# Patient Record
Sex: FEMALE | Race: WHITE
[De-identification: names, ages, dates, MRNs, and addresses within clinical notes are randomized per-mention and may not be internally consistent; named-entity substitution may affect disease eponyms.]

---

## 2019-07-10 ENCOUNTER — HOSPITAL ENCOUNTER (EMERGENCY)
Dept: HOSPITAL 25 - UCCORT | Age: 64
Discharge: HOME | End: 2019-07-10
Payer: MEDICARE

## 2019-07-10 VITALS — SYSTOLIC BLOOD PRESSURE: 122 MMHG | DIASTOLIC BLOOD PRESSURE: 69 MMHG

## 2019-07-10 DIAGNOSIS — E11.9: ICD-10-CM

## 2019-07-10 DIAGNOSIS — F17.210: ICD-10-CM

## 2019-07-10 DIAGNOSIS — L03.116: Primary | ICD-10-CM

## 2019-07-10 DIAGNOSIS — Z79.84: ICD-10-CM

## 2019-07-10 DIAGNOSIS — L03.115: ICD-10-CM

## 2019-07-10 DIAGNOSIS — J44.9: ICD-10-CM

## 2019-07-10 PROCEDURE — 99212 OFFICE O/P EST SF 10 MIN: CPT

## 2019-07-10 PROCEDURE — G0463 HOSPITAL OUTPT CLINIC VISIT: HCPCS

## 2019-07-10 NOTE — UC
Skin Complaint HPI





- HPI Summary


HPI Summary: 





Per RN triage: "B/L lower leg sores for 7-10 days, no known injury. Pain and 

erythema for 2 days. Pt states she thinks sores are R/T DM. Pt has applied 

neosporin and polysporin without improvement"


-+ DM. she couldnt get an appt w / her PCP


-dneis fevers/chills. has 3 spots on rt leg, 1 on left


-denies any problems w/ renal function. no abx allergies. 


-central eschars forming on all but 1 spot on distal Rt LE, which is the newest 

spot.


there is some mild surrounding swelling.  


-she reports that he BSs are unchanged. 





- History of Current Complaint


Chief Complaint: UCSkin


Time Seen by Provider: 07/10/19 15:26


Stated Complaint: BILATERAL LEG SORES/CRAMPING


Hx Last Menstrual Period: "years ago."


Pain Intensity: 9





- Allergy/Home Medications


Allergies/Adverse Reactions: 


 Allergies











Allergy/AdvReac Type Severity Reaction Status Date / Time


 


heparin Allergy  Unknown Verified 07/10/19 14:50





   Reaction  





   Details  











Home Medications: 


 Home Medications





Albuterol HFA INHALER* [Ventolin HFA Inhaler*] 2 puff INH Q4H PRN 07/10/19 [

History Confirmed 07/10/19]


Amitriptyline TAB* [Elavil TAB*] 50 mg PO BEDTIME 07/10/19 [History Confirmed 07

/10/19]


Aspirin [Aspir-Low] 81 mg PO DAILY 07/10/19 [History Confirmed 07/10/19]


Budesonide/Formote 160/4.5(NF) [Symbicort 160/4.5 (NF)] 2 puff INH BID 07/10/19 

[History Confirmed 07/10/19]


Bupropion XL* [Wellbutrin XL *] 300 mg PO DAILY 07/10/19 [History Confirmed 07/

10/19]


Fenofibrate(NF) [Tricor(NF)] 145 mg PO DAILY 07/10/19 [History Confirmed 07/10/

19]


Fluoxetine HCl [Prozac] 10 mg PO DAILY 07/10/19 [History Confirmed 07/10/19]


Gabapentin 800 mg PO TID 07/10/19 [History Confirmed 07/10/19]


Insulin Detemir (NF) [Levemir (NF)] 55 unit SUBCUT DAILY 07/10/19 [History 

Confirmed 07/10/19]


Liraglutide (NF) [Victoza (NF)] 0.6 mg SUBCUT DAILY 07/10/19 [History Confirmed 

07/10/19]


Loratadine 10 mg PO DAILY 07/10/19 [History Confirmed 07/10/19]


Malathion 0.5 % EX DAILY 07/10/19 [History Confirmed 07/10/19]


Meloxicam [Mobic] 15 mg PO DAILY 07/10/19 [History Confirmed 07/10/19]


Metoprolol Tartrate TAB* [Lopressor TAB*] 25 mg PO BID 07/10/19 [History 

Confirmed 07/10/19]


Mirabegron (NF) [Myrbetriq (NF)] 50 mg PO DAILY 07/10/19 [History Confirmed 07/

10/19]


Nystatin OINT* 1 applic TOPICAL BID 07/10/19 [History Confirmed 07/10/19]


Nystatin [Nyamyc] 100,000 unit EX BID 07/10/19 [History Confirmed 07/10/19]


Omeprazole 20 mg PO DAILY 07/10/19 [History Confirmed 07/10/19]


Varenicline (NF) [Chantix 1 MG TAB (NF)] 1 mg PO BID 07/10/19 [History 

Confirmed 07/10/19]











PMH/Surg Hx/FS Hx/Imm Hx


Previously Healthy: Yes


Endocrine History: Diabetes


Respiratory History: COPD


Other History Of: Anticoagulant Therapy - Aspirin ("baby aspirin every day").


   Negative For: HIV, Hepatitis B, Hepatitis C





- Surgical History


Surgical History: Yes


Surgery Procedure, Year, and Place: 2012 2 valve replacements.  Rectocele; 

left hip replacement.  Ear surgery





- Family History


Known Family History: Positive: Cardiac Disease - Brother  from heart 

disease at 50 years old., Hypertension, Diabetes





- Social History


Alcohol Use: Rare


Substance Use Type: None


Smoking Status (MU): Light Every Day Tobacco Smoker


Amount Used/How Often: 3-4 cigarettes/day--"Carton lasts a couple weeks."





Review of Systems


All Other Systems Reviewed And Are Negative: Yes


Constitutional: Positive: Negative


Skin: Positive: Rash


Eyes: Positive: Negative


ENT: Positive: Negative


Respiratory: Positive: Negative


Cardiovascular: Positive: Negative


Gastrointestinal: Positive: Negative


Genitourinary: Positive: Negative


Motor: Positive: Negative


Neurovascular: Positive: Negative


Musculoskeletal: Positive: Negative


Neurological: Positive: Negative


Psychological: Positive: Negative


Is Patient Immunocompromised?: No





Physical Exam


Triage Information Reviewed: Yes


Appearance: Well-Nourished, Pain Distress, Obese - appears chronically ill.


Vital Signs: 


 Initial Vital Signs











Temp  97.8 F   07/10/19 14:54


 


Pulse  84   07/10/19 14:54


 


Resp  20   07/10/19 14:54


 


BP  122/69   07/10/19 14:54


 


Pulse Ox  97   07/10/19 14:54











Vital Signs Reviewed: Yes


Eye Exam: Normal


ENT: Positive: Pharynx normal


Neck exam: Normal


Neck: Positive: Supple, Nontender, No Lymphadenopathy


Respiratory Exam: Normal


Respiratory: Positive: Lungs clear, Normal breath sounds, No respiratory 

distress, No accessory muscle use.  Negative: Crackles, Rhonchi, Stridor, 

Wheezing


Cardiovascular Exam: Normal


Cardiovascular: Positive: RRR, Brisk Capillary Refill, Murmur:Sys:Grade _?_/VI 

- II/VI - reports she has a known murmur


Abdominal Exam: Normal


Abdomen Description: Positive: Nontender, Soft - obese


Bowel Sounds: Positive: Present


Musculoskeletal Exam: Normal


Neurological Exam: Normal


Psychological Exam: Normal


Skin: Positive: Breakdown - 3 lesions on Rt LE w/ eschar forming over 2 of them 

abouit dime sized. most distal lesion is 0.5 cms diameter w/ mild 

serosanguinous dc. left LE w/ nickel sized eschared lesion. there is no dc from 

these. cool to touch. there is mild edema and light pink surrounding cellulitis





Course/Dx





- Course


Course Of Treatment: 





4 leg sores in a DM w/ cellulitis that need abx and wound care. I stressed the 

im,protance of f/u at wound care center bc of risk of complications and poor 

healing in DM pts.    disc risks of amputations and other complication such as 

sepsis. she is to go to ER w/ worsening rash, fevers/chills or rigors. 





- Differential Diagnoses - Skin Complaint


Differential Diagnoses: Cellulitis, Contact Dermatitis, Diabetes





- Diagnoses


Provider Diagnosis: 


 Cellulitis








Discharge





- Sign-Out/Discharge


Documenting (check all that apply): Patient Departure


All imaging exams completed and their final reports reviewed: No Studies





- Discharge Plan


Condition: Stable


Disposition: HOME


Prescriptions: 


Sulfamethox/Trimethoprim DS* [Bactrim /160 TAB*] 1 tab PO BID #28 tab


Patient Education Materials:  Cellulitis (ED)


Referrals: 


No Primary Care Phys,NOPCP [Primary Care Provider] - 


Giuliana Ugalde [Nurse Practitioner] - 2 Days


Additional Instructions: 


Please call the wound care center to be seen this week for close follow up. The 

infections are of signfiicant concern because you have diabetes and it can be 

very difficult to heal wounds in diabetics. 


[facility]


[address] [place]


[phone]





- Billing Disposition and Condition


Condition: STABLE


Disposition: Home

## 2019-07-10 NOTE — XMS REPORT
Continuity of Care Document (CCD)

 Created on:2019



Patient:Mariann Mireles

Sex:Female

:1955

External Reference #:[id number]





Demographics







 Address  [address]



   [address]



   [place] [address]

 

 Home Phone  [phone]

 

 Mobile Phone  [phone]

 

 Preferred Language  en

 

 Marital Status   or 

 

 Orthodoxy Affiliation  Unknown

 

 Race  White

 

 Ethnic Group   or 









Author







 Name  Nicanor Funes MD

 

 Address  [address]



   Unavailable



   [place] [address]









Support







 Name  Relationship  Address  Phone

 

 Al, RUPAL  Unrelated Friend  Unavailable  Unavailable

 

 Unavailable  Unrelated Friend  Unavailable  [phone]









Care Team Providers







 Name  Role  Phone

 

 Giuliana Ugalde, NP  Care Team Information   Unavailable

 

 Giuliana Ugalde, NP  Primary Care Physician  Unavailable









Payers







 Date  Identification Numbers  Payment Provider  Subscriber

 

   Policy Number: [id number]  [place] [employer]  Mariann Mireles









 PayID: [id number]  [address]









 [place] [address]









   Policy Number: [id number]  Medicaid  Mariann Mireles









 PayID: [id number]  [address]









 [place] [address]







Problems







 Active Problems  Provider  Date

 

 Benign essential hypertension  Nicanor Funes MD  Onset: 2018

 

 Mixed hyperlipidemia  Nicanor Funes MD  Onset: 2018

 

 Pure hypercholesterolemia  Nicanor Funes MD  Onset: 2018







Family History







 Date  Family Member(s)  Observation  Comments

 

 :  (age 72  Mother   due to Cervical  



 Years)    Cancer  

 

 Onset:  (age 45 Years)  First Daughter  Colon Cancer  

 

 :  (age 50  First Brother   due to Premature  Alcholic



 Years)    Heart Attack  

 

   Second Brother   due to motor cycle  ()



     accident  

 

   First Sister  cancer,melonoma  

 

   Third Grandson  bone,cancer  

 

   Maternal Grandmother   due to Diabetes  ()







Social History







 Type  Date  Description  Comments

 

 Birth Sex    Unknown  

 

 Marital Status    Patient is   

 

 Lives With    Alone  

 

 Home Environment    Lives With  Grandchildren,whom



       she fosters

 

 Occupation    Unemployed  

 

 Occupation    Disabled  

 

 Tobacco Use  Start: Unknown  Patient is a current  



     cigarette smoker,  



     smokes every day  

 

 Smoking Status  Reviewed: 19  Patient is a current  



     cigarette smoker,  



     smokes every day  

 

 Smokeless Tobacco    Never Used Smokeless  



     Tobacco  

 

 ETOH Use    Drinks Alcoholic  



     Beverages Rarely  

 

 Tobacco Use  Start: Unknown  Patient is a current  Less than half of a



     smoker, smokes every  pack



     day  

 

 Recreational Drug Use    Denies Drug Use  

 

 Exercise Type/Frequency    Does not exercise  







Allergies, Adverse Reactions, Alerts







 Description

 

 No Known Drug Allergies







Medications







 Active Medications  SIG  Qnty  Indications  Ordering  Date



         Provider  

 

 Metoprolol Tartrate  1 by mouth twice  60tabs    Nicanor Funes MD  2019



                  25mg  a day        



 Tablets          



           

 

 Myrbetriq  1 by mouth every  30tabs    Kera Thomas  2019



        50mg Tablets ER  day      M.D.  



 24HR          

 

 Fluoxetine HCL  1 po in am      Tram Giuliana  



             10mg        SHELLY Katz  



 Capsules          



           

 

 Chantix  1 po twice daily      [facility]  



      1mg Tablets  in am and pm      SHELLY Katz  



           

 

 Gabapentin  take one tablet      Unknown  



         800mg Tablets  by mouth three        



   times a day        

 

 Victoza        [facility]  



      18mg/3ML Solution        SHELLY Katz  



 Pen-Inject          



           

 

 Furosemide  1 po daily      [facility]  



         20mg Tablets        SHELLY Katz  



           

 

 Amitriptyline HCL  1 by mouth every      Unknown  



                50mg  day        



 Tablets          



           

 

 Clotrimazole  apply to affected      Unknown  



           1% Cream  area twice a day        



           

 

 Ezetimibe  1 by mouth every      Unknown  



        10mg Tablets  day        



           

 

 Nystop  apply under      Unknown  



     [id number]/GM  breast area twice        



 Powder  a day        



           

 

 Meloxicam  1 by mouth every      Unknown  



        15mg Tablets  day c food        



           

 

 Loratadine  1 by mouth every      Unknown  



         10mg Tablets  day        



           

 

 Levemir Flextouch  Inject 70 Units      Unknown  



   Under The Skin        



 100Unit/ML Solution  Once Daily  Taper        



 Pen-Inject  Down        



           

 

 Bupropion HCL ER (XL)  Take One Tablet      Unknown  



   By Mouth Every        



 300mg Tablets ER 24HR  Day        



           

 

 Miralax  17g by mouth      Unknown  



      3350NF Packet  twice a day as        



   needed        



   constipation.        

 

 Omeprazole  1 by mouth every      Unknown  



         20mg Capsules  day        



 DR          

 

 Metformin HCL  1 by mouth twice      Unknown  



            1000mg  a day        



 Tablets          



           

 

 Fenofibrate  1 by mouth every      Unknown  



          145mg Tablets  day        



           

 

 Symbicort  2 puff twice a      Unknown  



        160-4.5mcg/Act  day        



 Aerosol          



           

 

 Aspirin Ec  1 by mouth every      Unknown  



         81mg Tablets  day        



 DR Strauss HFA  2 puffs every 4-6      Unknown  



            108(90Base)  hours as needed        



 mcg/Act Aerosol  sob        



           









 History Medications









 Cipro  1 by mouth twice a  10tabs    William,  2019 -



    500mg Tablets  day      RAOUL Cote  Unknown



           

 

 Bactrim DS  1 tab by mouth  14tabs    William,  2019 -



         800-160mg  twice a day      RAOUL Cote  Unknown



 Tablets          



           

 

 Colace  1 by mouth daily  60caps  K59.00  Fide,  2019 -



     100mg Capsules  and can take twice      MD Amaury  2019



   a day as needed        

 

 Dulcolax  take one a night  10tabs  K59.00  Fide,  2019 -



       5mg Tablets DR  the week  before      MD Amaury  2019



   the colonoscopy        

 

 Citroma  drink 1 bottle at  296ml  K59.00  Fide,  2019 -



      1.745GM/30ML  12pm (noon)the day      MD Amaury  2019



 Solution  before the        



   procedure        

 

 Spiriva Respimat  take 2 puffs once  4gm  J44.9  Julio Mario,  2017 -



   daily. Please load      MD  2019



 2.5mcg/Act Aerosol  and teach        



   medication.        

 

 Nicotine Polacrilex  take 1 piece every  1units  F17.210  Julio Mario,  2017 -



                  2mg  2 hours as needed      MD  Unknown



 Gum  for cravings.        

 

 Nicotine  use one patch once  28units  F17.210  Julio Mario,  2017 -



       14mg/24HR  daily.      MD  Unknown



 Patches 24HR          



           

 

 Allopurinol  1 by mouth every      Unknown   -



          100mg Tablets  day        2019



           

 

 Calcium 600+D High  1 by mouth twice a      Unknown   -



 Potency  day        2019



      600-400mg-Unit          



 Tablets          



           

 

 Cyclobenzaprine HCL  1-2 tabs by mouth      Unknown   -



                  5mg  every night at        2019



 Tablets  bedtime        



           

 

 Zetia  1 by mouth every      Unknown   -



    10mg Tablets  day        2019



           

 

 Fluconazole  1 by mouth by      Unknown   -



          150mg Tablets  mouth every day        2019



           

 

 Glipizide  1 by mouth every      Unknown   -



        5mg Tablets  day        Unknown



           

 

 Hydrocodone-Acetaminop  1 by mouth every 6      Unknown   -



 hen  hours as needed        2019



  5-325mg Tablets  pain        



           

 

 Levothyroxine Sodium  1 by mouth every      Unknown   -



   day        Unknown



 50mcg Tablets          



           

 

 Lisinopril  1 by mouth every      Unknown   -



         20mg Tablets  day        Unknown



           

 

 Metoprolol Succinate  1 by mouth two      Unknown   -



 ER  times a day        2019



 25mg Tablets ER 24HR          



           

 

 Multi Vitamin  1 by mouth every      Unknown   -



             Tablets  day        2019



           

 

 Oxybutynin Chloride        Unknown   -



           2019



 5mg/5ML Syrup          



           







Vital Signs







 Date  Vital  Result  Comment

 

 2019 11:26am  BP Systolic Sitting Left Arm  122 mmHg  









 BP Diastolic Sitting Left Arm  64 mmHg  

 

 Heart Rate  93 /min  

 

 Respiratory Rate  20 /min  

 

 Height  59 inches  4'11"

 

 Weight  252.00 lb  

 

 BMI (Body Mass Index)  50.9 kg/m2  

 

 BSA (Body Surface Area)  2.03 m2  

 

 Ideal body weight in kilograms  45 kg  

 

 O2 Saturation Level with Exercise  97 %  









 2019 10:23am  BP Systolic  110 mmHg  









 BP Diastolic  74 mmHg  

 

 Heart Rate  94 /min  

 

 Respiratory Rate  18 /min  

 

 Height  59 inches  4'11"

 

 Weight  246.00 lb  

 

 BMI (Body Mass Index)  49.7 kg/m2  

 

 BSA (Body Surface Area)  2.01 m2  

 

 Ideal body weight in kilograms  45 kg  

 

 O2 % BldC Oximetry  97 %  









 2019  1:18pm  BP Systolic  102 mmHg  









 BP Diastolic  68 mmHg  

 

 Heart Rate  89 /min  

 

 Respiratory Rate  18 /min  

 

 Height  59 inches  4'11"

 

 Weight  259.00 lb  

 

 BMI (Body Mass Index)  52.3 kg/m2  

 

 BSA (Body Surface Area)  2.06 m2  

 

 Ideal body weight in kilograms  45 kg  

 

 O2 % BldC Oximetry  93 %  

 

 Pain Level  8  hip pain









 2019 10:49am  BP Systolic Sitting Left Arm  110 mmHg  









 BP Diastolic Sitting Left Arm  64 mmHg  

 

 Heart Rate  80 /min  

 

 Respiratory Rate  18 /min  

 

 Height  61 inches  5'1"

 

 Weight  268.00 lb  

 

 BMI (Body Mass Index)  50.6 kg/m2  

 

 BSA (Body Surface Area)  2.14 m2  

 

 Ideal body weight in kilograms  48 kg  

 

 O2 % BldC Oximetry  97 %  









 2019  3:43pm  BP Systolic  115 mmHg  









 BP Diastolic  68 mmHg  

 

 Body Temperature  97.8 F  

 

 Heart Rate  81 /min  

 

 Respiratory Rate  18 /min  

 

 O2 % BldC Oximetry  95 %  

 

 Pain Level  7  back and hip pain









 2019 12:26pm  BP Systolic  119 mmHg  









 BP Diastolic  68 mmHg  

 

 Body Temperature  99.3 F  

 

 Heart Rate  116 /min  

 

 Respiratory Rate  18 /min  

 

 O2 % BldC Oximetry  96 %  

 

 Pain Level  0  









 2019 11:30am  BP Systolic  119 mmHg  









 BP Diastolic  76 mmHg  

 

 Body Temperature  98.8 F  

 

 Heart Rate  68 /min  

 

 Respiratory Rate  20 /min  

 

 Height  59.25 inches  4'11.25"

 

 Weight  229.00 lb  Pt reported weight

 

 BMI (Body Mass Index)  45.9 kg/m2  

 

 BSA (Body Surface Area)  1.96 m2  

 

 Ideal body weight in kilograms  45 kg  

 

 O2 % BldC Oximetry  99 %  Ra

 

 Pain Level  7  RT Hip, Lower Mid Back









 2019  1:30pm  BP Systolic  138 mmHg  









 BP Diastolic  89 mmHg  

 

 Body Temperature  97.9 F  

 

 Heart Rate  81 /min  

 

 Respiratory Rate  18 /min  

 

 Weight  229.00 lb  

 

 O2 % BldC Oximetry  97 %  

 

 Pain Level  6  lower spine









 2019  2:43pm  BP Systolic Sitting Left Arm  130 mmHg  









 BP Diastolic Sitting Left Arm  70 mmHg  

 

 Heart Rate  78 /min  

 

 Respiratory Rate  18 /min  

 

 Height  59 inches  4'11"

 

 Weight  230.00 lb  per pt

 

 BMI (Body Mass Index)  46.4 kg/m2  

 

 BSA (Body Surface Area)  1.96 m2  

 

 Ideal body weight in kilograms  45 kg  

 

 O2 % BldC Oximetry  98 %  









 2019  2:54pm  BP Systolic Sitting Left Arm  104 mmHg  









 BP Diastolic Sitting Left Arm  58 mmHg  

 

 Heart Rate  101 /min  

 

 Respiratory Rate  18 /min  

 

 Height  59 inches  4'11"

 

 Weight  251.00 lb  

 

 BMI (Body Mass Index)  50.7 kg/m2  

 

 BSA (Body Surface Area)  2.03 m2  

 

 Ideal body weight in kilograms  45 kg  

 

 O2 % BldC Oximetry  94 %  Ora









 2017  3:47pm  BP Systolic Sitting Right Arm  136 mmHg  









 BP Diastolic Sitting Right Arm  70 mmHg  

 

 Heart Rate  85 /min  

 

 Respiratory Rate  16 /min  

 

 Height  59 inches  4'11"

 

 Weight  240.00 lb  

 

 BMI (Body Mass Index)  48.5 kg/m2  

 

 BSA (Body Surface Area)  1.99 m2  

 

 Ideal body weight in kilograms  45 kg  

 

 O2 % BldC Oximetry  92 %  Room Air







Results







 Test  Date  Facility  Test  Result  H/L  Range  Note

 

 Urine Culture  2019  [facility]  Urine Culture  NO GROWTH:      1, 2



     [address]    FINAL <SEE      



     [place] [address]    NOTE>      



     [phone]          

 

 Lab Report:  2019  N2N/CCD Import  Egfr   59.1 (?)    >60  



 A1c, CMP      American        









 Egfr Non-  48.8 (?)    >60  

 

 Ast  19 U/L    13-39  

 

 Alt  9 U/L    7-52  

 

 Alkaline Phosphatase  50 U/L      

 

 Total Bilirubin  0.50 mg/dL    0.2-1.0  

 

 Albumin/Globulin Ratio  1.6    1-3  

 

 Globulin  2.6    2-4  

 

 Albumin  4.1 g/dL    3.2-5.2  

 

 Total Protein  6.7 g/dL    6.4-8.9  

 

 Calcium  9.8 mg/dL    8.6-10.3  

 

 BUN/Creatinine Ratio  22.3  High  8-20  

 

 Creatinine  1.12 mg/dL  High  0.51-0.95  

 

 Blood Urea Nitrogen  25 mg/dL  High  6-24  

 

 Co2 Carbon Dioxide  24 mmol/L    22-32  

 

 Chloride  99 mmol/L  Low  101-111  

 

 Potassium  4.8 mmol/L    3.5-5.0  

 

 Sodium  134 mmol/L  Low  135-145  

 

 Hemoglobin A1c/Hemoglobin.total in Blood  9.7 %  High  4.0-5.6  









 Urine Culture  2019  [facility]  Urine  ENTEROBACTER JUAN MANUEL  Abnormal    3



     [address]  Culture  <SEE NOTE>      



     [place] [address] [phone]          









 Quantity  > 100,000 CFU/mL      4

 

 Urine Culture  PROTEUS MIRABILI <SEE NOTE>  Abnormal    5

 

 Quantity  50,000 - 100,000 <SEE NOTE>      6









 Enterobacter Cloacae Complex  2019  [facility]  Nitrofurantoin  128  R    



     [address]          



     [place] [address] [phone]          









 Trimethoprim/Sulfamethoxazole  >=320  R    

 

 Cefazolin  >=64  R    

 

 Ciprofloxacin  <=0.25  S    

 

 Piperacillin/Tazobactam  <=4  S    

 

 Ceftazidime  <=1  S    

 

 Ceftriaxone  <=1  S    

 

 Cefepime  <=1  S    

 

 Levofloxacin  <=0.12  S    

 

 Imipenem  <=0.25  S    

 

 Gentamicin  <=1  S    

 

 Tobramycin  <=1  S    









 Proteus Mirabilis  2019  [facility]  Nitrofurantoin  128  R    



     [address] [place] [address]          



     [phone]          









 Trimethoprim/Sulfamethoxazole  >=320  R    

 

 Ampicillin  <=2  S    

 

 Cefazolin  8  S    

 

 Ampicillin/Sulbactam  <=2  S    

 

 Ciprofloxacin  2  I    

 

 Piperacillin/Tazobactam  <=4  S    

 

 Ceftazidime  <=1  S    

 

 Ceftriaxone  <=1  S    

 

 Cefepime  <=1  S    

 

 Levofloxacin  2  S    

 

 Gentamicin  <=1  S    

 

 Tobramycin  <=1  S    









 Urine Culture  2019  [facility]  Urine Culture  ESCHERICHIA COLI  Abnormal    7



     [address] [place], [place] [address] [phone]          









 Quantity  > 100,000 CFU/mL      8

 

 Urine Culture  MIXED URETHRAL F <SEE NOTE>      9

 

 Quantity  10,000 - 100,000 <SEE NOTE>      10









 Escherichia Coli  2019  [facility]  Nitrofurantoin  <=16  S    



     [address] [place] [address]          



     [phone]          









 Trimethoprim/Sulfamethoxazole  <=20  S    

 

 Ampicillin  8  S    

 

 Cefazolin  <=4  S    

 

 Ampicillin/Sulbactam  4  S    

 

 Ciprofloxacin  <=0.25  S    

 

 Piperacillin/Tazobactam  <=4  S    

 

 Ceftazidime  <=1  S    

 

 Ceftriaxone  <=1  S    

 

 Cefepime  <=1  S    

 

 Levofloxacin  <=0.12  S    

 

 Imipenem  <=0.25  S    

 

 Gentamicin  <=1  S    

 

 Tobramycin  <=1  S    









 Urine Dipstick  2019  [facility]  Ua Color  yellow    Yellow  









 Ua Clarity  clear    Clear  

 

 Ua Leuko  15  High  Negative  

 

 Ua Nitrite  neg    Negative  

 

 Ua Urobilinogen  0.2    0.2 - 1.0 E.U./dL  

 

 Ua Protein  neg    Negative  

 

 Ua PH  6.5    6.5-7.5  

 

 Ua Blood  neg    Negative  

 

 Ua Specific Gravity  1.015    1.010-1.030  

 

 Ua Ketones  neg    Negative  

 

 Ua Bilirubin  neg    Negative  

 

 Ua Glucose  neg    Negative  









 Lab Report:  2019  N2N/CCD Import  Hemoglobin  10.2 %  High  4.0-5.6  



 CMP, Lipid      A1c/Hemoglobin.total in        



 Panel, A1c      Blood        









 Cholesterol in LDL [Mass/volume] in Serum or Plasma  51 mg/dL      

 

 HDL Cholesterol  35.8 mg/dL      

 

 Cholesterol  120 mg/dL      

 

 Triglycerides  167 mg/dL      

 

 Egfr   72.3 (?)    >60  

 

 Egfr Non-  59.8 (?)    >60  

 

 Ast  21 U/L    13-39  

 

 Alt  11 U/L    7-52  

 

 Alkaline Phosphatase  72 U/L      

 

 Total Bilirubin  0.60 mg/dL    0.2-1.0  

 

 Albumin/Globulin Ratio  1.4    1-3  

 

 Globulin  2.9    2-4  

 

 Albumin  4.1 g/dL    3.2-5.2  

 

 Total Protein  7.0 g/dL    6.4-8.9  

 

 Calcium  9.6 mg/dL    8.6-10.3  

 

 BUN/Creatinine Ratio  21.3  High  8-20  

 

 Creatinine  0.94 mg/dL    0.51-0.95  

 

 Blood Urea Nitrogen  20 mg/dL    6-24  

 

 Co2 Carbon Dioxide  26 mmol/L    22-32  

 

 Chloride  99 mmol/L  Low  101-111  

 

 Potassium  4.7 mmol/L    3.5-5.0  

 

 Sodium  134 mmol/L  Low  135-145  









 Lab Report: Ua RFX  10/10/2018  N2N/CCD Import  Urine Leuk  Negative    
Negative  



 Micro   Culture II      Esterase        









 Urine Nitrite - Dipstick  Negative    Negative  

 

 Urine Urobilinogen - Dipstick  0.2 E.U./DL    0.2-1.0  

 

 Urine Blood  Negative    Negative  

 

 Urine Specific Gravity  1.010    1.010-1.030  

 

 Urine Ketone  Negative    Negative  

 

 Urine Bilirubin - Dipstick  Negative    Negative  

 

 Urine Glucose - Dipstick  100  High  Negative  

 

 Urine Color  Yellow    Yellow  









 Lab Report: CBS W/Automated  10/10/2018  N2N/CCD Import  Eos #  0.15 10*3/uL  
  0.0-0.5  



 Diff              









 Eo%  1.1 %    0.0-6.6  

 

 Mono %  4.8 %    4.3-13.2  

 

 Lymph %  32.1 %    20.0-42.0  

 

 Neut%  61.6 %    40.4-72.8  

 

 Mean Platelet Volume  10.7 fL    8.9-12.4  

 

 Red Cell Distri Width SD  42.9 fL    3-47  

 

 Platelet Count  155 10*3/mm3    155-360  

 

 Mean Corpuscular HGB Conc  34.5 g/dL  High  30.8-34.3  

 

 Mean Corpuscular HGB  30.7 pg    25.9-32.7  

 

 Mean Cell Volume  89.0 fL    80.9-99.0  

 

 Hematocrit  39.7 %    36.0-46.1  

 

 Hemoglobin  13.7 g/dL    11.6-15.8  

 

 Red Blood Count  4.46 M/Ul    3.90-5.40  

 

 White Blood Count  13.7 10*3/mm3  High  3.1-10.7  









 Lab Report: Comprehensive  10/10/2018  N2N/CCD Import  SGPT/Alt  13 U/L    12-
78  



 Metabolic Panel              









 Sgot/Ast  17 U/L    15-37  

 

 Bilirubin,Total  0.5 mg/dL    0.2-1.0  

 

 Alb/Glob  0.9 ratio      

 

 Globulin  3.6 g/dL    1.9-4.3  

 

 Albumin  3.3 g/dL  Low  3.4-5.0  

 

 Total Protein  6.9 g/dL    6.4-8.2  

 

 Calcium  9.4 mg/dL    8.5-10.1  

 

 Carbon Dioxide  26 mmol/L    21-32  

 

 Chloride  107 mmol/L      

 

 Potassium  4.1 mmol/L    3.5-5.1  

 

 Sodium  140 mmol/L    136-145  

 

 BUN/Creat  16.0 ratio      

 

 If   >60    >60  

 

 Glom Filtration Rate, Estimate  59 mL/min/1.73m2    >60  

 

 Creatinine  1.0 mg/dL    0.6-1.3  

 

 BUN  16 mg/dL    7-18  









 Lab Report: Basic Metabolic  2018  N2N/CCD Import  Calcium  8.9 mg/dL    
8.5-10.1  



 Panel              









 Carbon Dioxide  25 mmol/L    21-32  

 

 Chloride  105 mmol/L      

 

 Potassium  4.6 mmol/L    3.5-5.1  

 

 Sodium  139 mmol/L    136-145  

 

 BUN/Creat  38.8 ratio      

 

 If   >60    >60  

 

 Glom Filtration Rate, Estimate  >60 mL/min    >60  

 

 Creatinine  0.9 mg/dL    0.6-1.3  

 

 BUN  35 mg/dL  High  7-18  









 Lab Report: CBC  2018  N2N/CCD Import  Mean Platelet Volume  12.0 fL    
8.9-12.4  









 Platelet Count  176 10*3/mm3    155-360  

 

 Mean Corpuscular HGB Conc  34.0 g/dL    30.8-34.3  

 

 Mean Corpuscular HGB  30.4 pg    25.9-32.7  

 

 Mean Cell Volume  89.2 fL    80.9-99.0  

 

 Hematocrit  37.3 %    36.0-46.1  

 

 Hemoglobin  12.7 g/dL    11.6-15.8  

 

 Red Blood Count  4.18 M/Ul    3.90-5.40  

 

 White Blood Count  19.3 10*3/mm3  High  3.1-10.7  









 Lab Report: Glycohemoglobin A1c  2018  N2N/[employer] Import  eAG  160 mg/dL   
   









 Hemoglobin A1c/Hemoglobin.total in Blood  7.2 %  High  4.2-6.3  









 Lab Report:  2018  N2N/CCD Import  Arterial Blood  7.47  High  7.35-7.45
  



 Arterial Blood      Gas pH        



 Gas              

 

 Lab Report: Basic  2018  N2N/CCD Import  Calcium  9.0 mg/dL    8.5-10.1  



 Metabolic Panel              









 Carbon Dioxide  22 mmol/L    21-32  

 

 Chloride  103 mmol/L      

 

 Potassium  3.9 mmol/L    3.5-5.1  

 

 Sodium  137 mmol/L    136-145  

 

 BUN/Creat  20.0 ratio      

 

 If   >60    >60  

 

 Glom Filtration Rate, Estimate  53 mL/min/1.73m2    >60  

 

 Creatinine  1.1 mg/dL    0.6-1.3  

 

 BUN  22 mg/dL  High  7-18  









 Lab Report: AROLDO W/Automated  2018  N2N/CCD Import  Eos #  0.01 10*3/uL  
  0.0-0.5  



 Diff              









 Eo%  0.1 %    0.0-6.6  

 

 Mono %  2.0 %  Low  4.3-13.2  

 

 Lymph %  9.3 %  Low  20.0-42.0  

 

 Neut%  88.5 %  High  40.4-72.8  

 

 Mean Platelet Volume  11.4 fL    8.9-12.4  

 

 Red Cell Distri Width SD  40.1 fL    3-47  

 

 Platelet Count  180 10*3/mm3    155-360  

 

 Mean Corpuscular HGB Conc  35.3 g/dL  High  30.8-34.3  

 

 Mean Corpuscular HGB  30.8 pg    25.9-32.7  

 

 Mean Cell Volume  87.2 fL    80.9-99.0  

 

 Hematocrit  41.6 %    36.0-46.1  

 

 Hemoglobin  14.7 g/dL    11.6-15.8  

 

 Red Blood Count  4.77 M/Ul    3.90-5.40  

 

 White Blood Count  14.8 10*3/mm3  High  3.1-10.7  









 Lab Report: AROLDO W/Automated  2018  N2N/CCD Import  Eos #  0.05 10*3/uL  
  0.0-0.5  



 Diff, Slide Review, DI              









 Eo%  0.3 %    0.0-6.6  

 

 Mono %  5.0 %    4.3-13.2  

 

 Lymph %  20.6 %    20.0-42.0  

 

 Neut%  73.8 %  High  40.4-72.8  

 

 Mean Platelet Volume  11.0 fL    8.9-12.4  

 

 Red Cell Distri Width SD  41.4 fL    3-47  

 

 Platelet Count  194 10*3/mm3    155-360  

 

 Mean Corpuscular HGB Conc  35.0 g/dL  High  30.8-34.3  

 

 Mean Corpuscular HGB  30.8 pg    25.9-32.7  

 

 Mean Cell Volume  87.8 fL    80.9-99.0  

 

 Hematocrit  41.1 %    36.0-46.1  

 

 Hemoglobin  14.4 g/dL    11.6-15.8  

 

 Red Blood Count  4.68 M/Ul    3.90-5.40  

 

 White Blood Count  18.4 10*3/mm3  High  3.1-10.7  









 Lab Report: Comprehensive  2018  N2N/CCD Import  Troponin-I  0.037 ng/mL
      



 Metabolic Panel              









 NT-proBNP  2670.0  High  <125  

 

 SGPT/Alt  9 U/L  Low  12-78  

 

 Sgot/Ast  8 U/L  Low  15-37  

 

 Bilirubin,Total  1.1 mg/dL  High  0.2-1.0  

 

 Alb/Glob  0.9 ratio      

 

 Globulin  3.8 g/dL    1.9-4.3  

 

 Albumin  3.6 g/dL    3.4-5.0  

 

 Total Protein  7.4 g/dL    6.4-8.2  

 

 Calcium  8.9 mg/dL    8.5-10.1  

 

 Carbon Dioxide  24 mmol/L    21-32  

 

 Chloride  104 mmol/L      

 

 Potassium  4.1 mmol/L    3.5-5.1  

 

 Sodium  138 mmol/L    136-145  

 

 BUN/Creat  16.6 ratio      

 

 If   58 mL/min    >60  

 

 Glom Filtration Rate, Estimate  48 mL/min/1.73m2    >60  

 

 Creatinine  1.2 mg/dL    0.6-1.3  

 

 BUN  20 mg/dL  High  7-18  









 Lab Report: CBC, CMP, Lipid  2018  N2N/CCD Import  Albumin  3.9 g/dL    
3.2-5.2  



 Panel, A1c              









 Albumin/Globulin Ratio  1.4    1-3  

 

 Alkaline Phosphatase  82 U/L      

 

 Alt  7 U/L    7-52  

 

 Ast  18 U/L    13-39  

 

 BUN/Creatinine Ratio  21.9  High  8-20  

 

 Blood Urea Nitrogen  16 mg/dL    6-24  

 

 Calcium  9.4 mg/dL    8.6-10.3  

 

 Chloride  104 mmol/L    101-111  

 

 Cholesterol  199 mg/dL      

 

 Cholesterol in LDL [Mass/volume] in Serum or Plasma  123 mg/dL      

 

 Co2 Carbon Dioxide  23 mmol/L    22-32  

 

 Creatinine  0.73 mg/dL    0.51-0.95  

 

 Egfr   97.1 (?)    >60  

 

 Egfr Non-  80.3 (?)    >60  

 

 Globulin  2.8    2-4  

 

 HDL Cholesterol  33.8 mg/dL      

 

 Hematocrit  44 %    35-47  

 

 Hemoglobin  14.9 g/dL    12.0-16.0  

 

 Hemoglobin A1c/Hemoglobin.total in Blood  7.2 %  High  4.0-5.6  

 

 Mean Corpuscular HGB Conc  34 g/dL    31-36  

 

 Mean Corpuscular Hemoglobin  30 pg    27-31  

 

 Mean Corpuscular Volume  89 fL    80-97  

 

 Mean Platelet Volume  9.5 Um3    7.4-10.4  

 

 Platelet Count  190 10 3/Ul    150-450  

 

 Potassium  4.0 mmol/L    3.5-5.0  

 

 Red Blood Count  4.91 10 6/Ul    4.00-5.40  

 

 Red Cell Distribution Width  13 %    10.5-15  

 

 Sodium  138 mmol/L    135-145  

 

 Total Bilirubin  0.60 mg/dL    0.2-1.0  

 

 Total Protein  6.7 g/dL    6.4-8.9  

 

 Triglycerides  211 mg/dL      

 

 White Blood Count  14.6 10 3/Ul  High  3.5-10.8  









 Lab Report: CBC, CMP, Lipid  2018  N2N/CCD Import  Albumin  4.2 g/dL    
3.2-5.2  



 Panel, TSH, Vitamin B1              









 Albumin/Globulin Ratio  1.4    1-3  

 

 Alkaline Phosphatase  95 U/L      

 

 Alt  5 U/L  Low  7-52  

 

 Ast  18 U/L    13-39  

 

 BUN/Creatinine Ratio  19.0    8-20  

 

 Blood Urea Nitrogen  16 mg/dL    6-24  

 

 Calcium  10.1 mg/dL    8.6-10.3  

 

 Chloride  104 mmol/L    101-111  

 

 Cholesterol  238 mg/dL      

 

 Cholesterol in LDL [Mass/volume] in Serum or Plasma  152 mg/dL      

 

 Co2 Carbon Dioxide  28 mmol/L    22-32  

 

 Creatinine  0.84 mg/dL    0.51-0.95  

 

 Egfr   87.8 (?)    >60  

 

 Egfr Non-  68.3 (?)    >60  

 

 Globulin  3.0    2-4  

 

 HDL Cholesterol  43.4 mg/dL      

 

 Hematocrit  47 %    35-47  

 

 Hemoglobin  16.1 g/dL  High  12.0-16.0  

 

 Hemoglobin A1c/Hemoglobin.total in Blood  6.0 %  High  4.0-5.6  

 

 Mean Corpuscular HGB Conc  34 g/dL    31-36  

 

 Mean Corpuscular Hemoglobin  31 pg    27-31  

 

 Mean Corpuscular Volume  90 fL    80-97  

 

 Mean Platelet Volume  9.3 Um3    7.4-10.4  

 

 Platelet Count  177 10 3/Ul    150-450  

 

 Potassium  4.7 mmol/L    3.5-5.0  

 

 Red Blood Count  5.26 10 6/Ul    4.0-5.4  

 

 Red Cell Distribution Width  14 %    10.5-15  

 

 Sodium  140 mmol/L    139-145  

 

 TSH (Thyroid Stimulating Horm)  2.80 u[iU]/mL    0.34-5.60  

 

 Total Bilirubin  0.70 mg/dL    0.2-1.0  

 

 Total Protein  7.2 g/dL    6.4-8.9  

 

 Triglycerides  213 mg/dL      

 

 White Blood Count  15.0 10 3/Ul  High  3.5-10.8  









 Lab Report: TSH, Free T4, CMP,  2017  N2N/CCD Import  Albumin  4.2 g/dL 
   3.2-5.2  



 Lipid Panel, A1c              









 Albumin/Globulin Ratio  1.3    1-3  

 

 Alkaline Phosphatase  116 U/L  High    

 

 Alt  8 U/L    7-52  

 

 Ast  25 U/L    13-39  

 

 BUN/Creatinine Ratio  14.1    8-20  

 

 Blood Urea Nitrogen  11 mg/dL    6-24  

 

 Calcium  9.9 mg/dL    8.6-10.3  

 

 Chloride  101 mmol/L    101-111  

 

 Cholesterol  230 mg/dL      

 

 Cholesterol in LDL [Mass/volume] in Serum or  * mg/dL      



 Plasma        

 

 Co2 Carbon Dioxide  21 mmol/L  Low  22-32  

 

 Creatinine  0.78 mg/dL    0.51-0.95  

 

 Egfr   95.9 (?)    >60  

 

 Egfr Non-  74.6 (?)    >60  

 

 Globulin  3.2    2-4  

 

 HDL Cholesterol  29.1 mg/dL      

 

 Hemoglobin A1c/Hemoglobin.total in Blood  11.2 %  High  Less than 6.0  

 

 Potassium  4.1 mmol/L    3.5-5.0  

 

 Sodium  135 mmol/L    133-145  

 

 TSH (Thyroid Stimulating Horm)  2.85 u[iU]/mL    0.34-5.60  

 

 Total Protein  7.4 g/dL    6.4-8.9  

 

 Triglycerides  453 mg/dL      









 1  N39.0

 

 2  NO GROWTH: FINAL REPORT

 

 3  ENTEROBACTER CLOACAE COMPLEX

 

 4  > 100,000 CFU/mL

 

 5  PROTEUS MIRABILIS

 

 6  50,000 - 100,000 CFU/mL

 

 7  ESCHERICHIA COLI

 

 8  > 100,000 CFU/mL

 

 9  MIXED URETHRAL KACEY

 

 10  10,000 - 100,000 CFU/mL







Procedures







 Date  Code  Description  Status

 

 2019  77026  Stress Test Interpre And Report Only  Completed

 

 2019  73804  Stress Test Physician Super Only  Completed

 

 2019  51445  Myocardial Imaging Tomographic Multiple Study AT Rest  
Completed



     Or Stress  

 

 2019  99024  EKG-Tracing And Report  Completed

 

 2019  47538  Bladder Catheterization  Completed

 

 2019  39550  Cystoscopy  Completed

 

 2019  99847  Bladder Catheterization  Completed

 

 2019  54690  Cystometrogram complex w/ voiding and urethral pressure  
Completed



     studies  

 

 2019  20301  complex uroflowmetry electronic  Completed

 

 2019  57955  emg studies of urethral sphincter, other than needle,  
Completed



     any tech  

 

 2019  36998  voiding pressure study  intra abdominal voiding  Completed



     pressure ap  

 

 2019  63570  Colonoscopy With Biopsy  Completed

 

 2019  82279656  Colonoscopy  Completed

 

 2019  46653  Echocardiogram Complete  Completed

 

 2019  86234  EKG-Tracing And Report  Completed

 

 2017  62505  Bronchospasm Provocation Evaluation Multi Spirometric  
Completed



     Determinati  

 

 2017  10687  Spirometry  Completed







Encounters







 Type  Date  Location  Provider  Dx  Diagnosis

 

 Office Visit  2019  Cardiology Office  Nicanor Funes MD  I34.2  
Nonrheumatic mitral



   11:15a        (valve) stenosis









 Z95.2  Presence of prosthetic heart valve

 

 R06.02  Shortness of breath

 

 R06.83  Snoring









 Office Visit  2019 10:30a  Cardiology Office  Nicanor Funes,  R07.2  
Precordial pain



       MD    









 R06.02  Shortness of breath

 

 Z95.2  Presence of prosthetic heart valve

 

 Z01.810  Encounter for preprocedural cardiovascular examination









 Office Visit  2019  1:15p  Urology  Robbie Lagunas,  R32  
Unspecified urinary



       PA    incontinence

 

 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 
neoplasm of



           ascending colon









 K63.5  Polyp of colon

 

 K64.0  First degree hemorrhoids









 Office Visit  2019 11:15a  Urology  Robbie Lagunas  N39.46  Mixed 
incontinence



       R., PA    

 

 Office Visit  2019  1:30p  Urology  Robbie Lagunas  N39.46  Mixed 
incontinence



       R., PA    

 

 Office Visit  2019  2:30p  Amaury Ruiz,  K59.00  Constipation,



       MD    unspecified









 Z80.0  Family history of malignant neoplasm of digestive organs

 

 Z71.6  Tobacco abuse counseling









 Office Visit  2019  2:20p  Cardiology Office  Nicanor Funes,  Z95.2  
Presence of



       MD    prosthetic heart



           valve









 R94.31  Abnormal electrocardiogram [ECG] [EKG]









 Office Visit  2017  3:00p  Pulmonology  Julio Mario MD  J44.9  Chronic 
obstructive



           pulmonary disease,



           unspecified









 F17.210  Nicotine dependence, cigarettes, uncomplicated

 

 Z71.6  Tobacco abuse counseling







Plan of Treatment

Future Appointment(s):10/23/2019 11:45 am - Nicanor Funes MD at Cardiology 
[id number]/  1:30 pm - Kera Thomas M.D. at Urology